# Patient Record
(demographics unavailable — no encounter records)

---

## 2024-12-14 NOTE — PHYSICAL EXAM

## 2024-12-14 NOTE — PHYSICAL EXAM

## 2024-12-14 NOTE — HISTORY OF PRESENT ILLNESS
[FreeTextEntry1] : 80yo female for evaluation of GERD, dyspepsia  She has gerd with heat but not pain in epigastric region and radiating up No clear exacerbating or mitigating factors. Has been going on for weeks She has been on omeprazole but no help She has hx ccy She is on eliquis for afib

## 2024-12-14 NOTE — ASSESSMENT
[FreeTextEntry1] : 78yo female with epigastric discomfort, GERD  likely acid related Will change to pantoprazole empirically will check egd r/o PUD  Risks and benefits of procedure(s) discussed with patient in detail, including but not limited to, perforation, bleeding, reaction to anesthesia, missed lesions.

## 2025-03-03 NOTE — ASSESSMENT
[FreeTextEntry1] : This is a 79 year old female with past medical history of  HTN, HLD, PAF on Eliquis, cerebral infarct 2022, GERD, and constipation requiring cardiac clearance for EGD, + nuclear stress test, initially refused cardiac cath, CTA coronaries with elevated calcium, presented 2/18 for elective cath at  with MVD, transferred to [facility] and underwent CABG x 4 (LIMA-LAD, V-Dx, V-OM, V-PDA)/ MISTY Clip/Encompass ablation, LAD endarterectomy 2/20 with Dr. Oreilly. postop course uneventful.  Today on exam patient's lungs clear bilaterally, sternum stable, incision clean, dry and intact. SVG site is clean, dry and intact. No peripheral edema noted. Instructed patient on importance of optimal glycemic control, daily showering, daily weights, incentive spirometer use, and increase ambulation as tolerated. Instructed to call office with any signs or symptoms of infection or weight gain of 2 or more pounds in 1 day or 3 or more pounds in 1 week.  Overall, I am pleased with her progress postoperatively. I am recommending that she continue follow up care with Cardiology and Primary Care Provider; she will return to care in office as needed. All questions answered, patient verbalizes understanding.   PLAN: - Continue follow up care with Cardiology - Continue follow up care with Primary Care Provider - Return to care in the office as needed

## 2025-03-03 NOTE — COUNSELING
[Hygeine (Including Daily Shower)] : hygeine (including daily shower) [Importance of Regular Medical Follow-Up] : the importance of regular medical follow-up [No Heavy Lifting] : no heavy lifting (>15-20 lb. for 1 month or 25 lb. for 3 months from date of surgery) [Blood Pressure Control] : blood pressure control [S/S of infection] : signs and symptoms of infection (and to whom it should be reported) [Progressive Ambulation/Activity] : progressive ambulation/activity [Medication/Vitamin/Herb/Food Interaction] : medication/vitamin/herb/food interaction [Low Fat/Low Cholesterol Diet] : low fat/low cholesterol diet [Blood Sugar Control] : blood sugar control

## 2025-03-03 NOTE — REASON FOR VISIT
[de-identified] : Coronary artery bypass grafting x 4 (LIMA-LAD, SVG-OM, SVG-Ramus, SVG-RCA), Left atrial appendage clip with AtriClip, Encompass ablation and endarterectomy of LAD [de-identified] : 02/20/25

## 2025-03-26 NOTE — REASON FOR VISIT
[Family Member] : family member [de-identified] : Coronary artery bypass grafting x 4 (LIMA-LAD, SVG-OM, SVG-Ramus, SVG-RCA), Left atrial appendage clip with AtriClip, Encompass ablation and endarterectomy of LAD [de-identified] : 02/20/25

## 2025-03-26 NOTE — REASON FOR VISIT
[Family Member] : family member [de-identified] : Coronary artery bypass grafting x 4 (LIMA-LAD, SVG-OM, SVG-Ramus, SVG-RCA), Left atrial appendage clip with AtriClip, Encompass ablation and endarterectomy of LAD [de-identified] : 02/20/25

## 2025-03-26 NOTE — ASSESSMENT
[FreeTextEntry1] : This is a 79 year old female with past medical history of  HTN, HLD, PAF on Eliquis, cerebral infarct 2022, GERD, and constipation requiring cardiac clearance for EGD, + nuclear stress test, initially refused cardiac cath, CTA coronaries with elevated calcium, presented 2/18 for elective cath at  with MVD, transferred to [facility] and underwent CABG x 4 (LIMA-LAD, V-Dx, V-OM, V-PDA)/ MISTY Clip/Encompass ablation, LAD endarterectomy 2/20 with Dr. Oreilly. She had been discharged to [facility] and is currently still placed there. She is recovering well and anticipates discharge in the next week.  on 03/10/25 she had increased shortens of breath and was brought to [facility] where a pleural effusion was noted. She was transferred back to [facility] for thoracentesis (1.7L drained from the left with pigtail) and discharged back to facility on 3/14/25.  Today on exam patient's lungs clear bilaterally, sternum stable, incision clean, dry and intact. SVG site is clean, dry and intact. No peripheral edema noted. Instructed patient on importance of optimal glycemic control, daily showering, daily weights, incentive spirometer use, and increase ambulation as tolerated. Instructed to call office with any signs or symptoms of infection or weight gain of 2 or more pounds in 1 day or 3 or more pounds in 1 week.  Overall, I am pleased with her progress postoperatively. I am recommending that she continue follow up care with Cardiology and Primary Care Provider; she will return to care in office as needed. All questions answered, patient verbalizes understanding.   PLAN: - Continue follow up care with Cardiology - Continue follow up care with Primary Care Provider - Return to care in the office as needed

## 2025-03-26 NOTE — ASSESSMENT
[FreeTextEntry1] : This is a 79 year old female with past medical history of  HTN, HLD, PAF on Eliquis, cerebral infarct 2022, GERD, and constipation requiring cardiac clearance for EGD, + nuclear stress test, initially refused cardiac cath, CTA coronaries with elevated calcium, presented 2/18 for elective cath at  with MVD, transferred to [facility] and underwent CABG x 4 (LIMA-LAD, V-Dx, V-OM, V-PDA)/ MISTY Clip/Encompass ablation, LAD endarterectomy 2/20 with Dr. Oreilly. She had been discharged to [facility] and is currently still placed there. She is recovering well and anticipates discharge in the next week.  on 03/10/25 she had increased shortens of breath and was brought to [facility] where a pleural effusion was noted. She was transferred back to [facility] for thoracentesis (1.7L drained from the left with pigtail) and discharged back to facility on 3/14/25.  Today on exam patient's lungs clear bilaterally, sternum stable, incision clean, dry and intact. SVG site is clean, dry and intact. No peripheral edema noted. Instructed patient on importance of optimal glycemic control, daily showering, daily weights, incentive spirometer use, and increase ambulation as tolerated. Instructed to call office with any signs or symptoms of infection or weight gain of 2 or more pounds in 1 day or 3 or more pounds in 1 week.  Overall, I am pleased with her progress postoperatively. I am recommending that she continue follow up care with Cardiology and Primary Care Provider; she will return to care in office as needed. All questions answered, patient verbalizes understanding.   PLAN: - Continue follow up care with Cardiology - Continue follow up care with Primary Care Provider - Return to care in the office as needed

## 2025-04-29 NOTE — HISTORY OF PRESENT ILLNESS
[FreeTextEntry1] : f/u rehab [de-identified] :  79 year old female with past medical history of HTN, HLD, PAF on Eliquis, cerebral infarct 2022, GERD, and constipation requiring cardiac clearance for EGD, + nuclear stress test, initially refused cardiac cath, CTA coronaries with elevated calcium, presented 2/18 for elective cath at  with MVD, transferred to [facility] and underwent CABG x 4 (LIMA-LAD, V-Dx, V-OM, V-PDA)/ MISTY Clip/Encompass ablation, LAD endarterectomy 2/20 with Dr. Oreilly. She had been discharged to [facility] and is currently still placed there. She is recovering well and anticipates discharge in the next week. on 03/10/25, complicated with pleural effusion s/p thoracocentesis went back to rehab 3/14/25 discharged 4/22/25 Accompained with alen Wilder Here for f/u already seen ct by CTSX daughter with her reports has a cough she has a f.u CT lung 4/29/25

## 2025-04-29 NOTE — PHYSICAL EXAM
[de-identified] : General: NAD HEENT: NC/AT, EOMI, PERRLA, pharynx moist and pink, no exudate. Neck: supple, no JVD. CVS: S1, S2 normal, RRR, no m/g/r Resp: CTA b/l, no wheeze/rale/rhonchi Abdomen: soft, NT/ND, positive bowel sounds. no HSM. Extremities: no edema, peripheral pulses 2+ bilaterally.  Neuro: aaox3, 5/5 motor strength in all 4 extremities. normal sensation, normal gait.  Psych: normal affect, no SI/HI.

## 2025-04-29 NOTE — HISTORY OF PRESENT ILLNESS
[FreeTextEntry1] : f/u rehab [de-identified] :  79 year old female with past medical history of HTN, HLD, PAF on Eliquis, cerebral infarct 2022, GERD, and constipation requiring cardiac clearance for EGD, + nuclear stress test, initially refused cardiac cath, CTA coronaries with elevated calcium, presented 2/18 for elective cath at  with MVD, transferred to [facility] and underwent CABG x 4 (LIMA-LAD, V-Dx, V-OM, V-PDA)/ MISTY Clip/Encompass ablation, LAD endarterectomy 2/20 with Dr. Oreilly. She had been discharged to [facility] and is currently still placed there. She is recovering well and anticipates discharge in the next week. on 03/10/25, complicated with pleural effusion s/p thoracocentesis went back to rehab 3/14/25 discharged 4/22/25 Accompained with alen Wilder Here for f/u already seen ct by CTSX daughter with her reports has a cough she has a f.u CT lung 4/29/25

## 2025-04-29 NOTE — PHYSICAL EXAM
[de-identified] : General: NAD HEENT: NC/AT, EOMI, PERRLA, pharynx moist and pink, no exudate. Neck: supple, no JVD. CVS: S1, S2 normal, RRR, no m/g/r Resp: CTA b/l, no wheeze/rale/rhonchi Abdomen: soft, NT/ND, positive bowel sounds. no HSM. Extremities: no edema, peripheral pulses 2+ bilaterally.  Neuro: aaox3, 5/5 motor strength in all 4 extremities. normal sensation, normal gait.  Psych: normal affect, no SI/HI.

## 2025-07-22 NOTE — HISTORY OF PRESENT ILLNESS
[FreeTextEntry1] : f/u [de-identified] : 79 year old female with past medical history of HTN, HLD, PAF on Eliquis, cerebral infarct 2022, GERD, and constipation requiring cardiac clearance for EGD, + nuclear stress test, initially refused cardiac cath, CTA coronaries with elevated calcium, presented 2/18 for elective cath at  with MVD, transferred to [facility] and underwent CABG x 4 (LIMA-LAD, V-Dx, V-OM, V-PDA)/ MISTY Clip/Encompass ablation, LAD endarterectomy 2/20 with Dr. Oreilly. She had been discharged to [facility] and is currently still placed there. She is recovering well and anticipates discharge in the next week. on 03/10/25, complicated with pleural effusion s/p thoracocentesis went back to rehab 3/14/25 discharged 4/22/25 Accompained with alen Wilder  Here for f/u need paper signed for home help doing well

## 2025-07-22 NOTE — REVIEW OF SYSTEMS
[FreeTextEntry2] :  Denies headcahe, problem with vision, cp, sob, abdominal pain, problem with bowel and bladder

## 2025-07-22 NOTE — PHYSICAL EXAM
[de-identified] : General: NAD HEENT: NC/AT, EOMI, PERRLA, pharynx moist and pink, no exudate. Neck: supple, no JVD. CVS: S1, S2 normal, RRR, no m/g/r Resp: CTA b/l, no wheeze/rale/rhonchi Abdomen: soft, NT/ND, positive bowel sounds. no HSM. Extremities: no edema, peripheral pulses 2+ bilaterally.  Neuro: aaox3, 5/5 motor strength in all 4 extremities. normal sensation, normal gait.  Psych: normal affect, no SI/HI.